# Patient Record
(demographics unavailable — no encounter records)

---
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
## 2025-04-17 NOTE — DISCUSSION/SUMMARY
[Physical Growth and Development] : physical growth and development [Social and Academic Competence] : social and academic competence [Emotional Well-Being] : emotional well-being [Risk Reduction] : risk reduction [Violence and Injury Prevention] : violence and injury prevention [Met privately with the adolescent for part of the office visit?] : Met privately with the adolescent for part of the office visit? Yes [Adolescent demonstrates understanding of his/her conditions and how to take prescribed medications?] : Adolescent demonstrates understanding of his/her conditions and how to take prescribed medications? Yes [Adolescent asks questions during each office  visit and participates in the care plan?] : Adolescent asks questions during each office visit and participates in the care plan? Yes [Adolescent is competent in independently making appointments, filling prescriptions, following up on referrals, and seeking emergency services, as needed?] : Adolescent is competent in independently making appointments, filling prescriptions, following up on referrals, and seeking emergency services, as needed? Yes [Adolescent's caregivers were provided with the opportunity to discuss their concerns about transferring decision making responsibility to the adolescent?] : Adolescent's caregivers were provided with the opportunity to discuss their concerns about transferring decision making responsibility to the adolescent? Yes [Discussed using Follow My Health to access health records and communicate with the adolescent's care team?] : Discussed using Follow My Health to access health records and communicate with the adolescent's care team? Yes [Initiated discussion about transfer to an adult healthcare provider?] : Initiated discussion about transfer to an adult healthcare provider? Yes  [FreeTextEntry1] : Stephan is a 17yo M with hx bilateral shoulder surgery, asthma, seasonal allergies here for annual PE  Plan: - Lab: CBC, CMOP, lipid, TSH, vit D. HIV, syphilis, GC/CT - Start seasonal allergy regimen: levocetirizine PO QD - Seasonal allergy prevention: limit outdoor exposure during high allergen count, shower before bedtime, use air purifier, keep windows closed, vacuum often - Albuterol MDI refilled - Discussed safe and responsible marijuana and alcohol use, side effects. Discourage nicotine use, side effects and addiction - Vaccine record provided to patient for college registration - FU annually for PE